# Patient Record
Sex: MALE | Race: WHITE | Employment: FULL TIME | URBAN - METROPOLITAN AREA
[De-identification: names, ages, dates, MRNs, and addresses within clinical notes are randomized per-mention and may not be internally consistent; named-entity substitution may affect disease eponyms.]

---

## 2017-10-24 ENCOUNTER — NURSE TRIAGE (OUTPATIENT)
Dept: NURSING | Facility: CLINIC | Age: 42
End: 2017-10-24

## 2017-10-24 NOTE — TELEPHONE ENCOUNTER
Has had swallowing problem since 2009 and Patient suspect a Hiatal Hernia , food get stuck in throat  when eating and if he burps or forces it will vomit . Dizzy spell and fatigue since 2010 and increasing  .  Fainted yesterday at work  5-8 minutes after he accidentally  cut him self  , when into boss office and said I think I am going to faint and then did within a few minutes.  Declines 911 , only wants a appointment for swallowing difficulty ASAP , sent to ..Bita Foster RN [place] [facility].        Reason for Disposition    [1] Swallowing difficulty AND [2] cause unknown (Exception: difficulty swallowing is a chronic symptom)    Difficult to awaken or acting confused  (e.g., disoriented, slurred speech)    Additional Information    Negative: [1] Severe difficulty swallowing (e.g., drooling or spitting) AND [2] started suddenly after taking a medicine or allergic food    Negative: Wheezing, stridor, hoarseness, or difficulty breathing    Negative: [1] Swollen tongue AND [2] sudden onset    Negative: Sounds like a life-threatening emergency to the triager    Negative: Mouth ulcers are seen    Negative: Sore throat (throat pain with swallowing)    Negative: Swallowed a (non-edible) foreign body    Negative: Feeding tube, questions or concerns related to    Negative: Swelling of tongue    Negative: [1] Symptoms of blocked esophagus (e.g., can't swallow normal secretions, drooling) AND [2] present now    Negative: Symptoms of food or bone stuck in throat or esophagus (e.g., pain in throat or chest, FB sensation, blood-tinged saliva)    Negative: [1] Severe difficulty swallowing (e.g., drooling or spitting, can't swallow water) AND [2] new onset AND [3] normal breathing    Negative: SEVERE symptoms of pill stuck in throat or esophagus (e.g., severe pain, bleeding, or inability to swallow liquids)    Negative: [1] Drinking very little AND [2] dehydration suspected (e.g., no urine > 12 hours, very dry  mouth, very lightheaded)    Negative: [1] Refuses to drink anything AND [2] for > 12 hours    Negative: Patient sounds very sick or weak to the triager    Negative: Fever > 100.5 F (38.1 C)    Negative: [1] Coughing spells AND [2] occur during eating/feedings or within 2 hours    Negative: [1] Symptoms of pill stuck in throat or esophagus (e.g., pain in throat or chest, FB sensation) AND [2] no relief after using CARE ADVICE    Negative: Weak immune system (e.g., HIV positive, cancer chemo, splenectomy, organ transplant, chronic steroids)    Negative: Still unconscious    Protocols used: SWALLOWING DIFFICULTY-ADULT-AH, FAINTING-ADULT-AH

## 2017-10-25 NOTE — PROGRESS NOTES
SUBJECTIVE:                                                    Ramon Conway is a 42 year old male who presents to clinic today for the following health issues:        Bita Iverson RN   10/24/17 3:30 PM   Note      Has had swallowing problem since 2009 and Patient suspect a Hiatal Hernia , food get stuck in throat  when eating and if he burps or forces it will vomit . Dizzy spell and fatigue since 2010 and increasing  .  Fainted yesterday at work  5-8 minutes after he accidentally  cut him self  , when into boss office and said I think I am going to faint and then did within a few minutes.  Declines 911 , only wants a appointment for swallowing difficulty ASAP , sent to ..Bita Foster RN [place] [facility].  Reason for Disposition    [1] Swallowing difficulty AND [2] cause unknown (Exception: difficulty swallowing is a chronic symptom)    Difficult to awaken or acting confused  (e.g., disoriented, slurred speech)    Additional Information    Negative: [1] Severe difficulty swallowing (e.g., drooling or spitting) AND [2] started suddenly after taking a medicine or allergic food    Negative: Wheezing, stridor, hoarseness, or difficulty breathing    Negative: [1] Swollen tongue AND [2] sudden onset    Negative: Sounds like a life-threatening emergency to the triager    Negative: Mouth ulcers are seen    Negative: Sore throat (throat pain with swallowing)    Negative: Swallowed a (non-edible) foreign body    Negative: Feeding tube, questions or concerns related to    Negative: Swelling of tongue    Negative: [1] Symptoms of blocked esophagus (e.g., can't swallow normal secretions, drooling) AND [2] present now    Negative: Symptoms of food or bone stuck in throat or esophagus (e.g., pain in throat or chest, FB sensation, blood-tinged saliva)    Negative: [1] Severe difficulty swallowing (e.g., drooling or spitting, can't swallow water) AND [2] new onset AND [3] normal breathing     Negative: SEVERE symptoms of pill stuck in throat or esophagus (e.g., severe pain, bleeding, or inability to swallow liquids)    Negative: [1] Drinking very little AND [2] dehydration suspected (e.g., no urine > 12 hours, very dry mouth, very lightheaded)    Negative: [1] Refuses to drink anything AND [2] for > 12 hours    Negative: Patient sounds very sick or weak to the triager    Negative: Fever > 100.5 F (38.1 C)    Negative: [1] Coughing spells AND [2] occur during eating/feedings or within 2 hours    Negative: [1] Symptoms of pill stuck in throat or esophagus (e.g., pain in throat or chest, FB sensation) AND [2] no relief after using CARE ADVICE    Negative: Weak immune system (e.g., HIV positive, cancer chemo, splenectomy, organ transplant, chronic steroids)    Negative: Still unconscious    Protocols used: SWALLOWING DIFFICULTY-ADULT-AH, FAINTING-ADULT-AH              Problem list and histories reviewed & adjusted, as indicated.  Additional history: as documented        Patient Active Problem List   Diagnosis     Gastroesophageal reflux disease, esophagitis presence not specified     Vasovagal syncope     History reviewed. No pertinent surgical history.    Social History   Substance Use Topics     Smoking status: Current Every Day Smoker     Types: Cigarettes, Other     Smokeless tobacco: Never Used      Comment: e-cig     Alcohol use Yes     Family History   Problem Relation Age of Onset     Uterine Cancer Mother      Asthma Sister          Current Outpatient Prescriptions   Medication Sig Dispense Refill     pantoprazole (PROTONIX) 40 MG EC tablet Take 1 tablet (40 mg) by mouth daily Take 30-60 minutes before a meal. 30 tablet 1     No Known Allergies  BP Readings from Last 3 Encounters:   10/27/17 110/76    Wt Readings from Last 3 Encounters:   10/27/17 162 lb (73.5 kg)                  Labs reviewed in EPIC        ROS:  Constitutional, HEENT, cardiovascular, pulmonary, GI, , musculoskeletal, neuro,  "skin, endocrine and psych systems are negative, except as otherwise noted.      OBJECTIVE:   /76 (BP Location: Right arm, Patient Position: Chair, Cuff Size: Adult Regular)  Pulse 74  Temp 98.1  F (36.7  C) (Oral)  Resp 16  Ht 5' 8.31\" (1.735 m)  Wt 162 lb (73.5 kg)  SpO2 98%  BMI 24.41 kg/m2  Body mass index is 24.41 kg/(m^2).   Physical Exam   Constitutional: He is oriented to person, place, and time. He appears well-developed and well-nourished.   HENT:   Head: Normocephalic and atraumatic.   Eyes: EOM are normal.   Neck: Neck supple.   Cardiovascular: Normal rate, regular rhythm, normal heart sounds and intact distal pulses.  Exam reveals no gallop.    No murmur heard.  Pulmonary/Chest: Effort normal and breath sounds normal. No respiratory distress. He has no wheezes. He has no rales. He exhibits no tenderness.   Abdominal: Soft. Bowel sounds are normal.   Neurological: He is alert and oriented to person, place, and time.   Psychiatric: He has a normal mood and affect.         Diagnostic Test Results:  none     ASSESSMENT/PLAN:     Problem List Items Addressed This Visit     Gastroesophageal reflux disease, esophagitis presence not specified - Primary     Symptoms consistent with reflux   Trial antacid for 1-2 months  Cut back on smoking   Discussed diet and lifestyle changes  Recheck in 1 month         Relevant Medications    pantoprazole (PROTONIX) 40 MG EC tablet    Vasovagal syncope     Episode of passing out is likely sec to shock and vasovagal syncope  Reassured  Reviewed labs from [facility] which showed normal CBC and BMP                    Magalys Lopez MD  [facility]"

## 2017-10-27 ENCOUNTER — OFFICE VISIT (OUTPATIENT)
Dept: FAMILY MEDICINE | Facility: OTHER | Age: 42
End: 2017-10-27
Payer: COMMERCIAL

## 2017-10-27 VITALS
DIASTOLIC BLOOD PRESSURE: 76 MMHG | OXYGEN SATURATION: 98 % | BODY MASS INDEX: 24.55 KG/M2 | HEART RATE: 74 BPM | RESPIRATION RATE: 16 BRPM | SYSTOLIC BLOOD PRESSURE: 110 MMHG | TEMPERATURE: 98.1 F | WEIGHT: 162 LBS | HEIGHT: 68 IN

## 2017-10-27 DIAGNOSIS — K21.9 GASTROESOPHAGEAL REFLUX DISEASE, ESOPHAGITIS PRESENCE NOT SPECIFIED: Primary | ICD-10-CM

## 2017-10-27 DIAGNOSIS — R55 VASOVAGAL SYNCOPE: ICD-10-CM

## 2017-10-27 PROCEDURE — 99214 OFFICE O/P EST MOD 30 MIN: CPT | Performed by: FAMILY MEDICINE

## 2017-10-27 RX ORDER — PANTOPRAZOLE SODIUM 40 MG/1
40 TABLET, DELAYED RELEASE ORAL DAILY
Qty: 30 TABLET | Refills: 1 | Status: SHIPPED | OUTPATIENT
Start: 2017-10-27

## 2017-10-27 ASSESSMENT — PAIN SCALES - GENERAL: PAINLEVEL: MILD PAIN (3)

## 2017-10-27 NOTE — NURSING NOTE
"Chief Complaint   Patient presents with     Dysphagia     Panel Management     height, mychart, rachana, tdap, flu, lipids       Initial /76 (BP Location: Right arm, Patient Position: Chair, Cuff Size: Adult Regular)  Pulse 74  Temp 98.1  F (36.7  C) (Oral)  Resp 16  Ht 5' 8.31\" (1.735 m)  Wt 162 lb (73.5 kg)  SpO2 98%  BMI 24.41 kg/m2 Estimated body mass index is 24.41 kg/(m^2) as calculated from the following:    Height as of this encounter: 5' 8.31\" (1.735 m).    Weight as of this encounter: 162 lb (73.5 kg).  Medication Reconciliation: complete   Britany Maravilla CMA (AAMA)      "

## 2017-10-27 NOTE — ASSESSMENT & PLAN NOTE
Symptoms consistent with reflux   Trial antacid for 1-2 months  Cut back on smoking   Discussed diet and lifestyle changes  Recheck in 1 month

## 2017-10-27 NOTE — MR AVS SNAPSHOT
After Visit Summary   10/27/2017    Ramon Conway    MRN: [id number]           Patient Information     Date Of Birth          1975        Visit Information        Provider Department      10/27/2017 1:40 PM Magalys Lopez MD [facility]        Today's Diagnoses     Gastroesophageal reflux disease, esophagitis presence not specified    -  1    Need for prophylactic vaccination and inoculation against influenza        Need for prophylactic vaccination with tetanus-diphtheria (TD)        Vasovagal syncope          Care Instructions      Tips to Control Acid Reflux    To control acid reflux, you ll need to make some basic diet and lifestyle changes. The simple steps outlined below may be all you ll need to ease discomfort.  Watch what you eat    Avoid fatty foods and spicy foods.    Eat fewer acidic foods, such as citrus and tomato-based foods. These can increase symptoms.    Limit drinking alcohol, caffeine, and fizzy beverages. All increase acid reflux.    Try limiting chocolate, peppermint, and spearmint. These can worsen acid reflux in some people.  Watch when you eat    Avoid lying down for 3 hours after eating.    Do not snack before going to bed.  Raise your head  Raising your head and upper body by 4 to 6 inches helps limit reflux when you re lying down. Put blocks under the head of your bed frame to raise it.  Other changes    Lose weight, if you need to    Don t exercise near bedtime    Avoid tight-fitting clothes    Limit aspirin and ibuprofen    Stop smoking   Date Last Reviewed: 7/1/2016 2000-2017 [employer]. [address] [place] [address], [place] [address]. All rights reserved. This information is not intended as a substitute for professional medical care. Always follow your healthcare professional's instructions.                Follow-ups after your visit        Follow-up notes from your care team     Return in about 2 months (around 12/27/2017), or if  "symptoms worsen or fail to improve.      Who to contact     If you have questions or need follow up information about today's clinic visit or your schedule please contact [facility] directly at [phone].  Normal or non-critical lab and imaging results will be communicated to you by MyChart, letter or phone within 4 business days after the clinic has received the results. If you do not hear from us within 7 days, please contact the clinic through MyChart or phone. If you have a critical or abnormal lab result, we will notify you by phone as soon as possible.  Submit refill requests through [employer] or call your pharmacy and they will forward the refill request to us. Please allow 3 business days for your refill to be completed.          Additional Information About Your Visit        [employer] Information     [employer] lets you send messages to your doctor, view your test results, renew your prescriptions, schedule appointments and more. To sign up, go to [email or web address] . Click on \"Log in\" on the left side of the screen, which will take you to the Welcome page. Then click on \"Sign up Now\" on the right side of the page.     You will be asked to enter the access code listed below, as well as some personal information. Please follow the directions to create your username and password.     Your access code is: [email or web address]  Expires: 2018  2:17 PM     Your access code will  in 90 days. If you need help or a new code, please call your [facility] clinic or [phone].        Care EveryWhere ID     This is your Care EveryWhere ID. This could be used by other organizations to access your [facility] medical records  [id number]        Your Vitals Were     Pulse Temperature Respirations Height Pulse Oximetry BMI (Body Mass Index)    74 98.1  F (36.7  C) (Oral) 16 5' 8.31\" (1.735 m) 98% 24.41 kg/m2       Blood Pressure from Last 3 Encounters:   10/27/17 110/76    Weight from Last 3 Encounters: "   10/27/17 162 lb (73.5 kg)              Today, you had the following     No orders found for display         Today's Medication Changes          These changes are accurate as of: 10/27/17  2:17 PM.  If you have any questions, ask your nurse or doctor.               Start taking these medicines.        Dose/Directions    pantoprazole 40 MG EC tablet   Commonly known as:  PROTONIX   Used for:  Gastroesophageal reflux disease, esophagitis presence not specified   Started by:  Magalys Lopez MD        Dose:  40 mg   Take 1 tablet (40 mg) by mouth daily Take 30-60 minutes before a meal.   Quantity:  30 tablet   Refills:  1            Where to get your medicines      These medications were sent to [facility] [address] [place] [address] [address] [facility] [address]  [address] [facility] [address]     Phone:  [phone]     pantoprazole 40 MG EC tablet                Primary Care Provider Office Phone # Fax #    [facility] [phone]       [address] [place] [id number]        Equal Access to Services     ABDON JACOBSON : Hadii benjamin eller hadasho Soomaali, waaxda luqadaha, qaybta kaalmada adetitayamansoor, robyn macedo. So [facility] [phone].    ATENCIÓN: Si habla español, tiene a bustamante disposición servicios gratuitos de asistencia lingüística. [facility] [phone].    We comply with applicable federal civil rights laws and [place] laws. We do not discriminate on the basis of race, color, national origin, age, disability, sex, sexual orientation, or gender identity.            Thank you!     Thank you for choosing [facility]  for your care. Our goal is always to provide you with excellent care. Hearing back from our patients is one way we can continue to improve our services. Please take a few minutes to complete the written survey that you may receive in the mail after your visit with us. Thank you!              Your Updated Medication List - Protect others around you: Learn how to safely use, store and throw away your medicines at www.disposemymeds.org.          This list is accurate as of: 10/27/17  2:17 PM.  Always use your most recent med list.                   Brand Name Dispense Instructions for use Diagnosis    pantoprazole 40 MG EC tablet    PROTONIX    30 tablet    Take 1 tablet (40 mg) by mouth daily Take 30-60 minutes before a meal.    Gastroesophageal reflux disease, esophagitis presence not specified

## 2017-10-27 NOTE — PATIENT INSTRUCTIONS
Tips to Control Acid Reflux    To control acid reflux, you ll need to make some basic diet and lifestyle changes. The simple steps outlined below may be all you ll need to ease discomfort.  Watch what you eat    Avoid fatty foods and spicy foods.    Eat fewer acidic foods, such as citrus and tomato-based foods. These can increase symptoms.    Limit drinking alcohol, caffeine, and fizzy beverages. All increase acid reflux.    Try limiting chocolate, peppermint, and spearmint. These can worsen acid reflux in some people.  Watch when you eat    Avoid lying down for 3 hours after eating.    Do not snack before going to bed.  Raise your head  Raising your head and upper body by 4 to 6 inches helps limit reflux when you re lying down. Put blocks under the head of your bed frame to raise it.  Other changes    Lose weight, if you need to    Don t exercise near bedtime    Avoid tight-fitting clothes    Limit aspirin and ibuprofen    Stop smoking   Date Last Reviewed: 7/1/2016 2000-2017 [employer]. [address] [place] [address], [place] [address]. All rights reserved. This information is not intended as a substitute for professional medical care. Always follow your healthcare professional's instructions.

## 2017-10-27 NOTE — ASSESSMENT & PLAN NOTE
Episode of passing out is likely sec to shock and vasovagal syncope  Reassured  Reviewed labs from [facility] which showed normal CBC and BMP

## 2017-10-30 ENCOUNTER — TELEPHONE (OUTPATIENT)
Dept: FAMILY MEDICINE | Facility: OTHER | Age: 42
End: 2017-10-30

## 2017-10-30 NOTE — TELEPHONE ENCOUNTER
Received notification that this drug is excluded from [facility] and for pharmacy to go through [facility] which I informed them of this.

## 2019-03-26 ENCOUNTER — TRANSFERRED RECORDS (OUTPATIENT)
Dept: HEALTH INFORMATION MANAGEMENT | Facility: CLINIC | Age: 44
End: 2019-03-26

## 2019-05-10 NOTE — PROGRESS NOTES
Sports Medicine Clinic Visit    PCP: Kartik [facility]    CC: Patient presents with:  Right Shoulder - Pain      HPI:  Ramon Conway is a 44 year old male who is seen as a self referral.   He notes right shoulder pain due to an injury March 2018 when he was skiing, took a jump, his skiis fell off and he landed without the skiis face forward. He rates the pain at a 2/10 at its worst and a 1/10 currently.  Symptoms are relieved with physical therapy, ice, ibuprofen. Symptoms are worsened by working with his shoulder at a 90 degree angle. He endorses weakness, fatigue, and snapping.   He denies swelling, bruising, grinding, catching, locking, instability, numbness and tingling.  Other treatment has included home exercises and theracane.        Review of Systems:  Musculoskeletal: as above  Remainder of review of systems is negative including constitutional, eyes, ENT, CV, pulmonary, GI, , endocrine, skin, hematologic, and neurologic except as noted in HPI or medical history.    History reviewed. No pertinent past surgical/medical/family/social history other than as mentioned in HPI.    Patient Active Problem List   Diagnosis     Gastroesophageal reflux disease, esophagitis presence not specified     Vasovagal syncope     No past medical history on file.  No past surgical history on file.  Family History   Problem Relation Age of Onset     Uterine Cancer Mother      Asthma Sister      Social History     Socioeconomic History     Marital status: Single     Spouse name: Not on file     Number of children: Not on file     Years of education: Not on file     Highest education level: Not on file   Occupational History     Not on file   Social Needs     Financial resource strain: Not on file     Food insecurity:     Worry: Not on file     Inability: Not on file     Transportation needs:     Medical: Not on file     Non-medical: Not on file   Tobacco Use     Smoking status: Current Every Day Smoker     Types:  "Cigarettes, Other     Smokeless tobacco: Never Used     Tobacco comment: e-cig   Substance and Sexual Activity     Alcohol use: Yes     Drug use: No     Sexual activity: Yes     Partners: Female   Lifestyle     Physical activity:     Days per week: Not on file     Minutes per session: Not on file     Stress: Not on file   Relationships     Social connections:     Talks on phone: Not on file     Gets together: Not on file     Attends [employer] service: Not on file     Active member of club or organization: Not on file     Attends meetings of clubs or organizations: Not on file     Relationship status: Not on file     Intimate partner violence:     Fear of current or ex partner: Not on file     Emotionally abused: Not on file     Physically abused: Not on file     Forced sexual activity: Not on file   Other Topics Concern     Parent/sibling w/ CABG, MI or angioplasty before 65F 55M? 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      No Known Allergies      Objective:  BP (!) 134/91   Pulse 113   Ht 1.728 m (5' 8.03\")   Wt 71.7 kg (158 lb)   BMI 24.00 kg/m      General: Alert and in no distress    Head: Normocephalic, atraumatic  Eyes: no scleral icterus or conjunctival erythema   Oropharynx:  Mucous membranes moist  Skin: no erythema, petechiae, or jaundice  CV: regular rhythm by palpation, 2+ distal pulses  Resp: normal respiratory effort without conversational dyspnea   Psych: normal mood and affect    Gait: Non-antalgic, appropriate coordination and balance   Neuro: Motor strength and sensation as noted below    Musculoskeletal:    Bilateral Shoulder exam    Inspection and Posture:       rounded shoulders and upper back    Skin:        no visible " deformities    Tenderness:  None    ROM:        Full active ROM with flexion, extension, abduction, adduction, internal and external rotation bilaterally    Painful motions:  -Right shoulder abduction and terminal flexion cause mild pain    Strength:        shoulder shrug 5/5 bilaterally       shoulder abduction 5/5 bilaterally       shoulder flexion 5/5 bilaterally       shoulder internal rotation 5/5 bilaterally       shoulder external rotation 5/5 bilaterally       elbow flexion 5/5 bilaterally       elbow extension 5/5 bilaterally       forearm pronation 5/5 bilaterally       forearm supination 5/5 bilaterally       wrist flexion 5/5 bilaterally       wrist extension 5/5 bilaterally        strength 5/5 bilaterally       finger abduction 5/5 bilaterally    Special testing:       neg (-) Neer bilaterally       neg (-) Petty bilaterally       neg (-) [employer] bilaterally    Sensation:        normal sensation over shoulder and upper extremity     Radiology:  Reviewed right shoulder x-rays in PACS - no osseous abnormalities seen.    Assessment:  1. Chronic right shoulder pain        Plan:  Discussed the assessment with the patient and developed a plan together:  -Continued shoulder pain after an injury in [place] 2018 despite physical therapy.  Discussed advanced imaging and Sandro would like to proceed.  Ordered MRI of the left shoulder with contrast - [facility] [phone]. Cost estimates can be provided by [facility] at [phone].  -Continue physical therapy.      Follow Up: Following completion of MRI in clinic. Please schedule at 1-2 days after MRI is completed to ensure we have the results of the MRI    Ramon to follow up with Primary Care provider regarding elevated blood pressure.      Hilaria Sims MD, CAQ Sports Medicine  [facility]

## 2019-05-14 ENCOUNTER — OFFICE VISIT (OUTPATIENT)
Dept: ORTHOPEDICS | Facility: CLINIC | Age: 44
End: 2019-05-14
Payer: COMMERCIAL

## 2019-05-14 VITALS
SYSTOLIC BLOOD PRESSURE: 134 MMHG | BODY MASS INDEX: 23.95 KG/M2 | HEIGHT: 68 IN | WEIGHT: 158 LBS | DIASTOLIC BLOOD PRESSURE: 91 MMHG | HEART RATE: 113 BPM

## 2019-05-14 DIAGNOSIS — G89.29 CHRONIC RIGHT SHOULDER PAIN: Primary | ICD-10-CM

## 2019-05-14 DIAGNOSIS — M25.511 CHRONIC RIGHT SHOULDER PAIN: Primary | ICD-10-CM

## 2019-05-14 PROCEDURE — 99204 OFFICE O/P NEW MOD 45 MIN: CPT | Performed by: PHYSICAL MEDICINE & REHABILITATION

## 2019-05-14 ASSESSMENT — MIFFLIN-ST. JEOR: SCORE: 1591.67

## 2019-05-14 NOTE — PATIENT INSTRUCTIONS
Today's Plan of Care:  -MRI of the left shoulder with contrast - [facility] [phone]. Cost estimates can be provided by [facility] at [phone].  -Continue physical therapy      Follow Up: Following completion of MRI in clinic. Please schedule at 1-2 days after MRI is completed to ensure we have the results of the MRI      Ramon to follow up with Primary Care provider regarding elevated blood pressure.

## 2019-05-14 NOTE — LETTER
5/14/2019         RE: Ramon Conway  [address] [facility] [address]        Dear Colleague,    Thank you for referring your patient, Ramon Conway, to the [facility]. Please see a copy of my visit note below.    Sports Medicine Clinic Visit    PCP: [facility]    CC: Patient presents with:  Right Shoulder - Pain      HPI:  Ramon Conway is a 44 year old male who is seen as a self referral.   He notes right shoulder pain due to an injury March 2018 when he was skiing, took a jump, his skiis fell off and he landed without the skiis face forward. He rates the pain at a 2/10 at its worst and a 1/10 currently.  Symptoms are relieved with physical therapy, ice, ibuprofen. Symptoms are worsened by working with his shoulder at a 90 degree angle. He endorses weakness, fatigue, and snapping.   He denies swelling, bruising, grinding, catching, locking, instability, numbness and tingling.  Other treatment has included home exercises and theracane.        Review of Systems:  Musculoskeletal: as above  Remainder of review of systems is negative including constitutional, eyes, ENT, CV, pulmonary, GI, , endocrine, skin, hematologic, and neurologic except as noted in HPI or medical history.    History reviewed. No pertinent past surgical/medical/family/social history other than as mentioned in HPI.    Patient Active Problem List   Diagnosis     Gastroesophageal reflux disease, esophagitis presence not specified     Vasovagal syncope     No past medical history on file.  No past surgical history on file.  Family History   Problem Relation Age of Onset     Uterine Cancer Mother      Asthma Sister      Social History     Socioeconomic History     Marital status: Single     Spouse name: Not on file     Number of children: Not on file     Years of education: Not on file     Highest education level: Not on file   Occupational History     Not on file   Social Needs     Financial resource  "strain: Not on file     Food insecurity:     Worry: Not on file     Inability: Not on file     Transportation needs:     Medical: Not on file     Non-medical: Not on file   Tobacco Use     Smoking status: Current Every Day Smoker     Types: Cigarettes, Other     Smokeless tobacco: Never Used     Tobacco comment: e-cig   Substance and Sexual Activity     Alcohol use: Yes     Drug use: No     Sexual activity: Yes     Partners: Female   Lifestyle     Physical activity:     Days per week: Not on file     Minutes per session: Not on file     Stress: Not on file   Relationships     Social connections:     Talks on phone: Not on file     Gets together: Not on file     Attends Religion service: Not on file     Active member of club or organization: Not on file     Attends meetings of clubs or organizations: Not on file     Relationship status: Not on file     Intimate partner violence:     Fear of current or ex partner: Not on file     Emotionally abused: Not on file     Physically abused: Not on file     Forced sexual activity: Not on file   Other Topics Concern     Parent/sibling w/ CABG, MI or angioplasty before 65F 55M? 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      No Known Allergies      Objective:  BP (!) 134/91   Pulse 113   Ht 1.728 m (5' 8.03\")   Wt 71.7 kg (158 lb)   BMI 24.00 kg/m       General: Alert and in no distress    Head: Normocephalic, atraumatic  Eyes: no scleral icterus or conjunctival erythema   Oropharynx:  Mucous membranes moist  Skin: no erythema, petechiae, or jaundice  CV: regular rhythm by palpation, 2+ distal pulses  Resp: normal respiratory effort without conversational dyspnea   Psych: normal mood and affect    Gait: " Non-antalgic, appropriate coordination and balance   Neuro: Motor strength and sensation as noted below    Musculoskeletal:    Bilateral Shoulder exam    Inspection and Posture:       rounded shoulders and upper back    Skin:        no visible deformities    Tenderness:  None    ROM:        Full active ROM with flexion, extension, abduction, adduction, internal and external rotation bilaterally    Painful motions:  -Right shoulder abduction and terminal flexion cause mild pain    Strength:        shoulder shrug 5/5 bilaterally       shoulder abduction 5/5 bilaterally       shoulder flexion 5/5 bilaterally       shoulder internal rotation 5/5 bilaterally       shoulder external rotation 5/5 bilaterally       elbow flexion 5/5 bilaterally       elbow extension 5/5 bilaterally       forearm pronation 5/5 bilaterally       forearm supination 5/5 bilaterally       wrist flexion 5/5 bilaterally       wrist extension 5/5 bilaterally        strength 5/5 bilaterally       finger abduction 5/5 bilaterally    Special testing:       neg (-) Neer bilaterally       neg (-) Petty bilaterally       neg (-) [place]'s bilaterally    Sensation:        normal sensation over shoulder and upper extremity     Radiology:  Reviewed right shoulder x-rays in PACS - no osseous abnormalities seen.    Assessment:  1. Chronic right shoulder pain        Plan:  Discussed the assessment with the patient and developed a plan together:  -Continued shoulder pain after an injury in [place] 2018 despite physical therapy.  Discussed advanced imaging and Sandro would like to proceed.  Ordered MRI of the left shoulder with contrast - [facility] [phone]. Cost estimates can be provided by [facility] at [phone].  -Continue physical therapy.      Follow Up: Following completion of MRI in clinic. Please schedule at 1-2 days after MRI is completed to ensure we have the results of the MRI    Ramon to follow up with  Primary Care provider regarding elevated blood pressure.      Hilaria Sims MD, [facility] Sports Medicine  [facility]      Again, thank you for allowing me to participate in the care of your patient.        Sincerely,        Loni Sims MD

## 2019-05-16 ENCOUNTER — HOSPITAL ENCOUNTER (OUTPATIENT)
Dept: MRI IMAGING | Facility: CLINIC | Age: 44
End: 2019-05-16
Attending: PHYSICAL MEDICINE & REHABILITATION
Payer: COMMERCIAL

## 2019-05-16 ENCOUNTER — HOSPITAL ENCOUNTER (OUTPATIENT)
Dept: GENERAL RADIOLOGY | Facility: CLINIC | Age: 44
End: 2019-05-16
Attending: PHYSICAL MEDICINE & REHABILITATION
Payer: COMMERCIAL

## 2019-05-16 ENCOUNTER — HOSPITAL ENCOUNTER (OUTPATIENT)
Dept: GENERAL RADIOLOGY | Facility: CLINIC | Age: 44
Discharge: HOME | End: 2019-05-16
Attending: PHYSICAL MEDICINE & REHABILITATION | Admitting: PHYSICAL MEDICINE & REHABILITATION
Payer: COMMERCIAL

## 2019-05-16 DIAGNOSIS — M25.511 CHRONIC RIGHT SHOULDER PAIN: ICD-10-CM

## 2019-05-16 DIAGNOSIS — G89.29 CHRONIC RIGHT SHOULDER PAIN: ICD-10-CM

## 2019-05-16 PROCEDURE — 73222 MRI JOINT UPR EXTREM W/DYE: CPT | Mod: RT

## 2019-05-16 PROCEDURE — 70030 X-RAY EYE FOR FOREIGN BODY: CPT | Mod: TC

## 2019-05-16 PROCEDURE — 25000125 ZZHC RX 250: Performed by: RADIOLOGY

## 2019-05-16 PROCEDURE — A9585 GADOBUTROL INJECTION: HCPCS | Performed by: RADIOLOGY

## 2019-05-16 PROCEDURE — 25500064 ZZH RX 255 OP 636: Performed by: RADIOLOGY

## 2019-05-16 PROCEDURE — 40000265 XR GADOLINIUM INJECTION

## 2019-05-16 RX ORDER — IOPAMIDOL 408 MG/ML
50 INJECTION, SOLUTION INTRAVASCULAR ONCE
Status: COMPLETED | OUTPATIENT
Start: 2019-05-16 | End: 2019-05-16

## 2019-05-16 RX ORDER — GADOBUTROL 604.72 MG/ML
0.1 INJECTION INTRAVENOUS ONCE
Status: COMPLETED | OUTPATIENT
Start: 2019-05-16 | End: 2019-05-16

## 2019-05-16 RX ADMIN — GADOBUTROL 0.1 ML: 604.72 INJECTION INTRAVENOUS at 08:33

## 2019-05-16 RX ADMIN — LIDOCAINE HYDROCHLORIDE 1 ML: 10 INJECTION, SOLUTION EPIDURAL; INFILTRATION; INTRACAUDAL; PERINEURAL at 08:27

## 2019-05-16 RX ADMIN — IOPAMIDOL 2 ML: 408 INJECTION, SOLUTION INTRAVASCULAR at 08:32

## 2019-05-16 NOTE — PROGRESS NOTES
Sports Medicine Clinic Visit - Interim History May 16, 2019    Initial Visit Date 5/14/19  Initial Injury Date 3/2018    PCP: [facility], [facility]    Ramon Conway is a 44 year old male who is seen in follow up for Chronic right shoulder pain. Since last visit on 5/14/19 patient has not had any changes in his shoulder pain.  He rates the pain at a 0/10 currently.  Symptoms are relieved with Ice, Ibuprofen and physical therapy.  Symptoms are worsened by  Working with his shoulder at a 90 degree angle. He is here today to review MRI results.     Review of Systems  Musculoskeletal: as above  Remainder of review of systems is negative including constitutional, eyes, ENT, CV, pulmonary, GI, , endocrine, skin, hematologic, and neurologic except as noted in HPI or medical history.    History reviewed. No pertinent past surgical/medical/family/social history other than as mentioned in HPI.    Patient Active Problem List   Diagnosis     Gastroesophageal reflux disease, esophagitis presence not specified     Vasovagal syncope     No past medical history on file.  No past surgical history on file.  Family History   Problem Relation Age of Onset     Uterine Cancer Mother      Asthma Sister      Social History     Socioeconomic History     Marital status: Single     Spouse name: Not on file     Number of children: Not on file     Years of education: Not on file     Highest education level: Not on file   Occupational History     Not on file   Social Needs     Financial resource strain: Not on file     Food insecurity:     Worry: Not on file     Inability: Not on file     Transportation needs:     Medical: Not on file     Non-medical: Not on file   Tobacco Use     Smoking status: Current Every Day Smoker     Types: Cigarettes, Other     Smokeless tobacco: Never Used     Tobacco comment: e-cig   Substance and Sexual Activity     Alcohol use: Yes     Drug use: No     Sexual activity: Yes     Partners: Female   Lifestyle  "    Physical activity:     Days per week: Not on file     Minutes per session: Not on file     Stress: Not on file   Relationships     Social connections:     Talks on phone: Not on file     Gets together: Not on file     Attends Gnosticist service: Not on file     Active member of club or organization: Not on file     Attends meetings of clubs or organizations: Not on file     Relationship status: Not on file     Intimate partner violence:     Fear of current or ex partner: Not on file     Emotionally abused: Not on file     Physically abused: Not on file     Forced sexual activity: Not on file   Other Topics Concern     Parent/sibling w/ CABG, MI or angioplasty before 65F 55M? 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      No Known Allergies      Objective:  /80   Ht 1.728 m (5' 8.03\")   Wt 71.7 kg (158 lb)   BMI 24.00 kg/m      General: Alert and in no distress    Head: Normocephalic, atraumatic  Eyes: no scleral icterus or conjunctival erythema   Oropharynx:  Mucous membranes moist  Skin: no erythema, petechiae, or jaundice  Resp: normal respiratory effort without conversational dyspnea   Psych: normal mood and affect      Radiology:  Independent visualization of images performed and reviewed with Ramon.  MR ARTHROGRAM RIGHT SHOULDER WITH CONTRAST  5/16/2019 10:17 AM      HISTORY: Shoulder pain, acute, persistent; x-ray and exam nonspecific.  Right shoulder injury March 2018, continued pain.     TECHNIQUE:  Multiplanar, multisequence with intraarticular contrast.  Injection procedure dictated separately.     FINDINGS:  Osseous Acromion Outlet:  There is mild superior capsular thickening  at the AC joint. Type 1-2 acromion.  No os " acromiale.     Rotator Cuff: Supraspinatus tendon - mild-moderate tendinosis. There  is a linear longitudinal tear at the tendon insertion; the tear  measures 0.7 cm AP and involves less than one fourth of the tendon  thickness.  Infraspinatus tendon -  no tear or significant tendinosis.   Subscapularis tendon -  no tear or significant tendinosis.   Unremarkable teres minor tendon. No asymmetric muscle atrophy.      Labral Structures: No labral cyst. There is tearing of the  posterosuperior labrum (series 3 images 14-16).     Biceps Tendon: No tear, dislocation, or significant tendinosis.     Osseous and Cartilaginous Structures: Some resorptive change of the  humeral head. No bone contusion or fracture. No glenohumeral  osteoarthritis or apparent chondromalacia identified.     Joint Space: No definite synovitis is appreciated.     Additional Findings: No gadolinium within the subacromial-subdeltoid  bursa. There is minimal subacromial bursal fluid which could simply  relate to the injection procedure.                                                                      IMPRESSION:  1. 0.7 cm linear longitudinal tear at the supraspinatus tendon  insertion. Mild-moderate tendinosis.  2. Tearing of the posterosuperior labrum.     MARYBETH TAYLOR MD    Assessment:  1. Chronic right shoulder pain        Plan:  Discussed the assessment with the patient and developed a plan together:  -Continue physical therapy. Please do 5-6 days of exercises per week between formal sessions and the home exercises they provide.  -Ramon would like to try a Theragun, which is a product that provides percussive massage).  I informed him that I did not know if his insurance would pay for it but I could provide a DMA prescription.  Order dispensed and he was appreciative.  -We also discussed a steroid injection, trigger point injections, and referral to orthopedic surgery    Follow Up: As needed if symptoms fail to improve or worsen. Please call  with any questions or concerns.     Hilaria Sims MD, [facility] Sports Medicine  [facility]

## 2019-05-20 ENCOUNTER — OFFICE VISIT (OUTPATIENT)
Dept: ORTHOPEDICS | Facility: OTHER | Age: 44
End: 2019-05-20
Payer: COMMERCIAL

## 2019-05-20 VITALS
SYSTOLIC BLOOD PRESSURE: 120 MMHG | BODY MASS INDEX: 23.95 KG/M2 | DIASTOLIC BLOOD PRESSURE: 80 MMHG | HEIGHT: 68 IN | WEIGHT: 158 LBS

## 2019-05-20 DIAGNOSIS — M25.511 CHRONIC RIGHT SHOULDER PAIN: Primary | ICD-10-CM

## 2019-05-20 DIAGNOSIS — S43.431D TEAR OF RIGHT GLENOID LABRUM, SUBSEQUENT ENCOUNTER: ICD-10-CM

## 2019-05-20 DIAGNOSIS — G89.29 CHRONIC RIGHT SHOULDER PAIN: Primary | ICD-10-CM

## 2019-05-20 PROCEDURE — 99213 OFFICE O/P EST LOW 20 MIN: CPT | Performed by: PHYSICAL MEDICINE & REHABILITATION

## 2019-05-20 ASSESSMENT — MIFFLIN-ST. JEOR: SCORE: 1591.66

## 2019-05-20 NOTE — LETTER
5/20/2019         RE: Ramon Conway  [address] [address] [facility] [address]        Dear Colleague,    Thank you for referring your patient, Ramon Conway, to the [facility]. Please see a copy of my visit note below.    Sports Medicine Clinic Visit - Interim History May 16, 2019    Initial Visit Date 5/14/19  Initial Injury Date 3/2018    PCP: Kartik, [facility]    Ramon Conway is a 44 year old male who is seen in follow up for Chronic right shoulder pain. Since last visit on 5/14/19 patient has not had any changes in his shoulder pain.  He rates the pain at a 0/10 currently.  Symptoms are relieved with Ice, Ibuprofen and physical therapy.  Symptoms are worsened by  Working with his shoulder at a 90 degree angle. He is here today to review MRI results.     Review of Systems  Musculoskeletal: as above  Remainder of review of systems is negative including constitutional, eyes, ENT, CV, pulmonary, GI, , endocrine, skin, hematologic, and neurologic except as noted in HPI or medical history.    History reviewed. No pertinent past surgical/medical/family/social history other than as mentioned in HPI.    Patient Active Problem List   Diagnosis     Gastroesophageal reflux disease, esophagitis presence not specified     Vasovagal syncope     No past medical history on file.  No past surgical history on file.  Family History   Problem Relation Age of Onset     Uterine Cancer Mother      Asthma Sister      Social History     Socioeconomic History     Marital status: Single     Spouse name: Not on file     Number of children: Not on file     Years of education: Not on file     Highest education level: Not on file   Occupational History     Not on file   Social Needs     Financial resource strain: Not on file     Food insecurity:     Worry: Not on file     Inability: Not on file     Transportation needs:     Medical: Not on file     Non-medical: Not on file   Tobacco Use     Smoking status:  "Current Every Day Smoker     Types: Cigarettes, Other     Smokeless tobacco: Never Used     Tobacco comment: e-cig   Substance and Sexual Activity     Alcohol use: Yes     Drug use: No     Sexual activity: Yes     Partners: Female   Lifestyle     Physical activity:     Days per week: Not on file     Minutes per session: Not on file     Stress: Not on file   Relationships     Social connections:     Talks on phone: Not on file     Gets together: Not on file     Attends Lutheran service: Not on file     Active member of club or organization: Not on file     Attends meetings of clubs or organizations: Not on file     Relationship status: Not on file     Intimate partner violence:     Fear of current or ex partner: Not on file     Emotionally abused: Not on file     Physically abused: Not on file     Forced sexual activity: Not on file   Other Topics Concern     Parent/sibling w/ CABG, MI or angioplasty before 65F 55M? 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      No Known Allergies      Objective:  /80   Ht 1.728 m (5' 8.03\")   Wt 71.7 kg (158 lb)   BMI 24.00 kg/m       General: Alert and in no distress    Head: Normocephalic, atraumatic  Eyes: no scleral icterus or conjunctival erythema   Oropharynx:  Mucous membranes moist  Skin: no erythema, petechiae, or jaundice  Resp: normal respiratory effort without conversational dyspnea   Psych: normal mood and affect      Radiology:  Independent visualization of images performed and reviewed with Ramon.  MR ARTHROGRAM RIGHT SHOULDER WITH CONTRAST  5/16/2019 10:17 AM      HISTORY: Shoulder pain, acute, persistent; x-ray and exam nonspecific.  Right shoulder injury March 2018, continued pain.     TECHNIQUE:  " Multiplanar, multisequence with intraarticular contrast.  Injection procedure dictated separately.     FINDINGS:  Osseous Acromion Outlet:  There is mild superior capsular thickening  at the AC joint. Type 1-2 acromion.  No os acromiale.     Rotator Cuff: Supraspinatus tendon - mild-moderate tendinosis. There  is a linear longitudinal tear at the tendon insertion; the tear  measures 0.7 cm AP and involves less than one fourth of the tendon  thickness.  Infraspinatus tendon -  no tear or significant tendinosis.   Subscapularis tendon -  no tear or significant tendinosis.   Unremarkable teres minor tendon. No asymmetric muscle atrophy.      Labral Structures: No labral cyst. There is tearing of the  posterosuperior labrum (series 3 images 14-16).     Biceps Tendon: No tear, dislocation, or significant tendinosis.     Osseous and Cartilaginous Structures: Some resorptive change of the  humeral head. No bone contusion or fracture. No glenohumeral  osteoarthritis or apparent chondromalacia identified.     Joint Space: No definite synovitis is appreciated.     Additional Findings: No gadolinium within the subacromial-subdeltoid  bursa. There is minimal subacromial bursal fluid which could simply  relate to the injection procedure.                                                                      IMPRESSION:  1. 0.7 cm linear longitudinal tear at the supraspinatus tendon  insertion. Mild-moderate tendinosis.  2. Tearing of the posterosuperior labrum.     MARYBETH TAYLOR MD    Assessment:  1. Chronic right shoulder pain        Plan:  Discussed the assessment with the patient and developed a plan together:  -Continue physical therapy. Please do 5-6 days of exercises per week between formal sessions and the home exercises they provide.  -Ramon would like to try a Theragun, which is a product that provides percussive massage).  I informed him that I did not know if his insurance would pay for it but I could provide a DMA  prescription.  Order dispensed and he was appreciative.  -We also discussed a steroid injection, trigger point injections, and referral to orthopedic surgery    Follow Up: As needed if symptoms fail to improve or worsen. Please call with any questions or concerns.     Hilaria Sims MD, [facility] Sports Medicine  [facility]        Again, thank you for allowing me to participate in the care of your patient.        Sincerely,        Loni Sims MD

## 2019-05-20 NOTE — PATIENT INSTRUCTIONS
Today's Plan of Care:  -Continue physical therapy. Please do 5-6 days of exercises per week between formal sessions and the home exercises they provide.  -Prescription for theragun provided.     -We also discussed other future treatment options:  Steroid injection, trigger point injections, and referral to orthopedic surgery    Follow Up: As needed if symptoms fail to improve or worsen. Please call with any questions or concerns.

## 2022-02-17 PROBLEM — K21.9 GASTROESOPHAGEAL REFLUX DISEASE: Status: ACTIVE | Noted: 2017-10-27
